# Patient Record
Sex: MALE | Race: WHITE | Employment: PART TIME | URBAN - METROPOLITAN AREA
[De-identification: names, ages, dates, MRNs, and addresses within clinical notes are randomized per-mention and may not be internally consistent; named-entity substitution may affect disease eponyms.]

---

## 2018-07-28 ENCOUNTER — HOSPITAL ENCOUNTER (EMERGENCY)
Age: 73
Discharge: HOME | End: 2018-07-29
Attending: EMERGENCY MEDICINE
Payer: MEDICARE

## 2018-07-28 ENCOUNTER — APPOINTMENT (OUTPATIENT)
Dept: GENERAL RADIOLOGY | Age: 73
End: 2018-07-28
Attending: EMERGENCY MEDICINE
Payer: MEDICARE

## 2018-07-28 DIAGNOSIS — R10.13 ABDOMINAL PAIN, EPIGASTRIC: Primary | ICD-10-CM

## 2018-07-28 PROCEDURE — 36415 COLL VENOUS BLD VENIPUNCTURE: CPT | Performed by: EMERGENCY MEDICINE

## 2018-07-28 PROCEDURE — 85025 COMPLETE CBC W/AUTO DIFF WBC: CPT | Performed by: EMERGENCY MEDICINE

## 2018-07-28 PROCEDURE — 99285 EMERGENCY DEPT VISIT HI MDM: CPT

## 2018-07-28 PROCEDURE — 84484 ASSAY OF TROPONIN QUANT: CPT | Performed by: EMERGENCY MEDICINE

## 2018-07-28 PROCEDURE — 71045 X-RAY EXAM CHEST 1 VIEW: CPT

## 2018-07-28 PROCEDURE — 80048 BASIC METABOLIC PNL TOTAL CA: CPT | Performed by: EMERGENCY MEDICINE

## 2018-07-28 PROCEDURE — 93005 ELECTROCARDIOGRAM TRACING: CPT

## 2018-07-28 RX ORDER — ASPIRIN 81 MG/1
81 TABLET ORAL DAILY
COMMUNITY

## 2018-07-28 RX ORDER — METFORMIN HYDROCHLORIDE 1000 MG/1
1000 TABLET ORAL 2 TIMES DAILY WITH MEALS
COMMUNITY

## 2018-07-28 RX ORDER — LEVOTHYROXINE SODIUM 175 UG/1
175 TABLET ORAL
COMMUNITY

## 2018-07-28 RX ORDER — CLOPIDOGREL BISULFATE 75 MG/1
75 TABLET ORAL DAILY
COMMUNITY

## 2018-07-28 RX ORDER — METOPROLOL SUCCINATE 25 MG/1
25 TABLET, EXTENDED RELEASE ORAL DAILY
COMMUNITY

## 2018-07-28 RX ORDER — SODIUM CHLORIDE 0.9 % (FLUSH) 0.9 %
5-10 SYRINGE (ML) INJECTION AS NEEDED
Status: DISCONTINUED | OUTPATIENT
Start: 2018-07-28 | End: 2018-07-29 | Stop reason: HOSPADM

## 2018-07-29 VITALS
OXYGEN SATURATION: 94 % | HEIGHT: 71 IN | BODY MASS INDEX: 33.18 KG/M2 | SYSTOLIC BLOOD PRESSURE: 105 MMHG | RESPIRATION RATE: 23 BRPM | HEART RATE: 74 BPM | WEIGHT: 237 LBS | TEMPERATURE: 98.9 F | DIASTOLIC BLOOD PRESSURE: 59 MMHG

## 2018-07-29 LAB
ANION GAP SERPL CALC-SCNC: 9 MMOL/L (ref 5–15)
ATRIAL RATE: 89 BPM
BASOPHILS # BLD: 0.1 K/UL (ref 0–0.1)
BASOPHILS NFR BLD: 1 % (ref 0–1)
BUN SERPL-MCNC: 21 MG/DL (ref 6–20)
BUN/CREAT SERPL: 20 (ref 12–20)
CALCIUM SERPL-MCNC: 8.9 MG/DL (ref 8.5–10.1)
CALCULATED P AXIS, ECG09: 14 DEGREES
CALCULATED R AXIS, ECG10: 79 DEGREES
CALCULATED T AXIS, ECG11: -4 DEGREES
CHLORIDE SERPL-SCNC: 104 MMOL/L (ref 97–108)
CO2 SERPL-SCNC: 24 MMOL/L (ref 21–32)
CREAT SERPL-MCNC: 1.07 MG/DL (ref 0.7–1.3)
DIAGNOSIS, 93000: NORMAL
DIFFERENTIAL METHOD BLD: ABNORMAL
EOSINOPHIL # BLD: 0 K/UL (ref 0–0.4)
EOSINOPHIL NFR BLD: 0 % (ref 0–7)
ERYTHROCYTE [DISTWIDTH] IN BLOOD BY AUTOMATED COUNT: 16.2 % (ref 11.5–14.5)
GLUCOSE SERPL-MCNC: 119 MG/DL (ref 65–100)
HCT VFR BLD AUTO: 41 % (ref 36.6–50.3)
HGB BLD-MCNC: 13 G/DL (ref 12.1–17)
IMM GRANULOCYTES # BLD: 0.1 K/UL (ref 0–0.04)
IMM GRANULOCYTES NFR BLD AUTO: 1 % (ref 0–0.5)
LYMPHOCYTES # BLD: 2.5 K/UL (ref 0.8–3.5)
LYMPHOCYTES NFR BLD: 23 % (ref 12–49)
MCH RBC QN AUTO: 27 PG (ref 26–34)
MCHC RBC AUTO-ENTMCNC: 31.7 G/DL (ref 30–36.5)
MCV RBC AUTO: 85.1 FL (ref 80–99)
MONOCYTES # BLD: 1 K/UL (ref 0–1)
MONOCYTES NFR BLD: 9 % (ref 5–13)
NEUTS SEG # BLD: 7.1 K/UL (ref 1.8–8)
NEUTS SEG NFR BLD: 66 % (ref 32–75)
NRBC # BLD: 0 K/UL (ref 0–0.01)
NRBC BLD-RTO: 0 PER 100 WBC
P-R INTERVAL, ECG05: 160 MS
PLATELET # BLD AUTO: 251 K/UL (ref 150–400)
PMV BLD AUTO: 10.5 FL (ref 8.9–12.9)
POTASSIUM SERPL-SCNC: 3.8 MMOL/L (ref 3.5–5.1)
Q-T INTERVAL, ECG07: 360 MS
QRS DURATION, ECG06: 96 MS
QTC CALCULATION (BEZET), ECG08: 438 MS
RBC # BLD AUTO: 4.82 M/UL (ref 4.1–5.7)
SODIUM SERPL-SCNC: 137 MMOL/L (ref 136–145)
TROPONIN I SERPL-MCNC: <0.05 NG/ML
TROPONIN I SERPL-MCNC: <0.05 NG/ML
VENTRICULAR RATE, ECG03: 89 BPM
WBC # BLD AUTO: 10.8 K/UL (ref 4.1–11.1)

## 2018-07-29 PROCEDURE — 36415 COLL VENOUS BLD VENIPUNCTURE: CPT | Performed by: EMERGENCY MEDICINE

## 2018-07-29 PROCEDURE — 84484 ASSAY OF TROPONIN QUANT: CPT | Performed by: EMERGENCY MEDICINE

## 2018-07-29 NOTE — ED PROVIDER NOTES
EMERGENCY DEPARTMENT HISTORY AND PHYSICAL EXAM 
     
 
Date: 7/28/2018 Patient Name: Erna Odell History of Presenting Illness Chief Complaint Patient presents with  Chest Pain History Provided By: Patient and EMS 
 
HPI: Erna Odell is a 81 y.o. male, pmhx CAD / stroke / DM / hypothyroid, who presents via EMS to the ED c/o gradual onset 10/10 \"heavy gas-like\" mid-sternal, non-radiating CP that began while driving this evening. Pt reports pulling over and taking 3 NTG, ~15 minutes apart, with minimal relief of sxs, at which time he called EMS. EMS reports giving the pt 324mg ASA. On evaluation in the ED, pt currently c/o nausea, and states his pain is improved to a 1/10. Pt states his \"usual heart attack pain\" occurs in his L shoulder and down his LUE. He denies any similar sxs at this time. Pt reports a hx of DM for which he takes Metformin daily. He further notes adherence with his daily Plavix and ASA. Pt states his last cardiac stress test was \"not too long ago\" and reportedly normal. He notes his last cardiac catheterization was \"a few years ago, but it was unchanged since 2010. \" Pt otherwise specifically denies any recent fevers, chills, vomiting, diarrhea, abd pain, SOB, urinary sxs, changes in BM, or headache. PCP: [facility]: Honey Dillon MD ([facility]) PMHx: Significant for MI, TIA, DM, CAD, hypothyroid PSHx: Significant for cardiac cath / stents, orthopedic surgery, hernia repair Social Hx: -tobacco (former), +EtOH (rarely), -Illicit Drugs There are no other complaints, changes, or physical findings at this time. Past History Past Medical History: 
Past Medical History:  
Diagnosis Date  CAD (coronary artery disease) stents 6; caths 17  
 Diabetes ([address]  Hypothyroid   
 no thyroid  Stroke ([facility] [address] Past Surgical History: 
Past Surgical History:  
Procedure Laterality Date  CARDIAC SURG PROCEDURE UNLIST    
 6 stents; 17 caths  HX GI    
 hernia repair  HX ORTHOPAEDIC    
 back  HX ORTHOPAEDIC Left   
 carpal tunnel  HX ORTHOPAEDIC Right   
 carpal tunnel  HX ORTHOPAEDIC Right   
 index finger and little finger Family History: 
History reviewed. No pertinent family history. Social History: 
Social History Substance Use Topics  Smoking status: Former Smoker  Smokeless tobacco: Never Used  Alcohol use Yes Comment: rarely Allergies: Allergies Allergen Reactions  Crestor [Rosuvastatin] Hives  Medrol [Methylprednisolone] Hives  Penicillins Nausea Only Review of Systems Review of Systems Constitutional: Negative for activity change, appetite change, chills, fever and unexpected weight change. HENT: Negative for congestion. Eyes: Negative for pain and visual disturbance. Respiratory: Negative for cough and shortness of breath. Cardiovascular: Positive for chest pain. Gastrointestinal: Positive for nausea. Negative for abdominal pain, diarrhea and vomiting. Genitourinary: Negative for dysuria. Musculoskeletal: Negative for back pain. Skin: Negative for rash. Neurological: Negative for headaches. Physical Exam  
Physical Exam  
Constitutional: He is oriented to person, place, and time. He appears well-developed and well-nourished. HENT:  
Head: Normocephalic and atraumatic. Mouth/Throat: Oropharynx is clear and moist.  
Eyes: Conjunctivae and EOM are normal. Pupils are equal, round, and reactive to light. Right eye exhibits no discharge. Left eye exhibits no discharge. Neck: Normal range of motion. Neck supple. Cardiovascular: Normal rate, regular rhythm and normal heart sounds. No murmur heard. Pulmonary/Chest: Effort normal and breath sounds normal. No respiratory distress. He has no wheezes. He has no rales. Abdominal: Soft. Bowel sounds are normal. He exhibits no distension. There is no tenderness.   
Musculoskeletal: Normal range of motion. He exhibits no edema. Neurological: He is alert and oriented to person, place, and time. No cranial nerve deficit. He exhibits normal muscle tone. Skin: Skin is warm and dry. No rash noted. He is not diaphoretic. Nursing note and vitals reviewed. Diagnostic Study Results Labs - Recent Results (from the past 12 hour(s)) EKG, 12 LEAD, INITIAL Collection Time: 07/28/18 10:57 PM  
Result Value Ref Range Ventricular Rate 89 BPM  
 Atrial Rate 89 BPM  
 P-R Interval 160 ms QRS Duration 96 ms  
 Q-T Interval 360 ms QTC Calculation (Bezet) 438 ms Calculated P Axis 14 degrees Calculated R Axis 79 degrees Calculated T Axis -4 degrees Diagnosis Normal sinus rhythm Abnormal QRS-T angle, consider primary T wave abnormality No previous ECGs available CBC WITH AUTOMATED DIFF Collection Time: 07/28/18 11:47 PM  
Result Value Ref Range WBC 10.8 4.1 - 11.1 K/uL  
 RBC 4.82 4.10 - 5.70 M/uL  
 HGB 13.0 12.1 - 17.0 g/dL HCT 41.0 36.6 - 50.3 % MCV 85.1 80.0 - 99.0 FL  
 MCH 27.0 26.0 - 34.0 PG  
 MCHC 31.7 30.0 - 36.5 g/dL  
 RDW 16.2 (H) 11.5 - 14.5 % PLATELET 059 548 - 905 K/uL MPV 10.5 8.9 - 12.9 FL  
 NRBC 0.0 0  WBC ABSOLUTE NRBC 0.00 0.00 - 0.01 K/uL NEUTROPHILS 66 32 - 75 % LYMPHOCYTES 23 12 - 49 % MONOCYTES 9 5 - 13 % EOSINOPHILS 0 0 - 7 % BASOPHILS 1 0 - 1 % IMMATURE GRANULOCYTES 1 (H) 0.0 - 0.5 % ABS. NEUTROPHILS 7.1 1.8 - 8.0 K/UL  
 ABS. LYMPHOCYTES 2.5 0.8 - 3.5 K/UL  
 ABS. MONOCYTES 1.0 0.0 - 1.0 K/UL  
 ABS. EOSINOPHILS 0.0 0.0 - 0.4 K/UL  
 ABS. BASOPHILS 0.1 0.0 - 0.1 K/UL  
 ABS. IMM. GRANS. 0.1 (H) 0.00 - 0.04 K/UL  
 DF AUTOMATED METABOLIC PANEL, BASIC Collection Time: 07/28/18 11:47 PM  
Result Value Ref Range Sodium 137 136 - 145 mmol/L Potassium 3.8 3.5 - 5.1 mmol/L Chloride 104 97 - 108 mmol/L  
 CO2 24 21 - 32 mmol/L Anion gap 9 5 - 15 mmol/L Glucose 119 (H) 65 - 100 mg/dL BUN 21 (H) 6 - 20 MG/DL Creatinine 1.07 0.70 - 1.30 MG/DL  
 BUN/Creatinine ratio 20 12 - 20 GFR est AA >60 >60 ml/min/1.73m2 GFR est non-AA >60 >60 ml/min/1.73m2 Calcium 8.9 8.5 - 10.1 MG/DL  
TROPONIN I Collection Time: 07/28/18 11:47 PM  
Result Value Ref Range Troponin-I, Qt. <0.05 <0.05 ng/mL TROPONIN I Collection Time: 07/29/18  2:56 AM  
Result Value Ref Range Troponin-I, Qt. <0.05 <0.05 ng/mL Radiologic Studies - CXR Results  (Last 48 hours) 07/28/18 2356  XR CHEST PORT Final result Impression:  Impression: 1. No acute disease Narrative:  INDICATION:  cp   
   
Exam: Portable chest 2351. Comparison: None. Findings: Cardiomediastinal silhouette is within normal limits. Pulmonary  
vasculature is not engorged. There are no focal parenchymal opacities,  
effusions, or pneumothorax. Right hemidiaphragm is elevated Medical Decision Making I am the first provider for this patient. I reviewed the vital signs, available nursing notes, past medical history, past surgical history, family history and social history. Vital Signs-Reviewed the patient's vital signs. Patient Vitals for the past 12 hrs: 
 Temp Pulse Resp BP SpO2  
07/29/18 0230 - 80 24 116/70 94 %  
07/29/18 0215 - 80 23 107/70 94 %  
07/29/18 0200 - 82 26 113/71 94 %  
07/29/18 0145 - 83 25 117/71 94 %  
07/29/18 0130 - 87 20 121/65 95 %  
07/29/18 0115 - 75 23 119/78 95 %  
07/29/18 0111 - 78 17 - 95 %  
07/29/18 0109 - - - 117/73 -  
07/29/18 0100 - 78 19 108/72 95 %  
07/29/18 0045 - 77 20 108/68 96 %  
07/29/18 0030 - 78 22 116/75 95 %  
07/29/18 0015 - 80 28 110/75 96 %  
07/28/18 2345 - 80 20 107/71 96 %  
07/28/18 2330 - 83 14 106/76 96 %  
07/28/18 2315 - 85 16 111/67 93 % 07/28/18 2305 98.9 °F (37.2 °C) 89 16 122/77 93 % 07/28/18 2303 - - - 122/77 -  
 
 
Pulse Oximetry Analysis - 94% on RA Cardiac Monitor:  
Rate: 90 bpm 
Rhythm: Normal Sinus Rhythm Records Reviewed: Nursing Notes, Old Medical Records, Previous electrocardiograms, Ambulance Run Sheet, Previous Radiology Studies and Previous Laboratory Studies Provider Notes (Medical Decision Making): MDM: elderly male with significant previous cardiac disease presenting with CP. Currently appears well, lower suspicion for PE and dissection. Exam and EKG WNL. Symptomatic management with observation and delta troponin evaluation. ED Course:  
Initial assessment performed. The patients presenting problems have been discussed, and they are in agreement with the care plan formulated and outlined with them. I have encouraged them to ask questions as they arise throughout their visit. EKG interpretation: (Preliminary) 2302 Rhythm: normal sinus rhythm. Rate (approx.): 89bpm; Axis: normal; Normal AR, QRS, QTc intervals; ST/T wave: normal; Other findings: non-ischemic. Written by Zana Ennis, ED Scribe, as dictated by Yue Masterson MD 
 
PROGRESS NOTE: 
2:23 AM 
Per [facility] chart review, pt's last ECHO in 03/2013 reports an EF 65%. Written by Zana Ennis, ED Scribe, as dictated by Yue Masterson MD 
 
Progress note: 
3:46 AM 
Pt noted to be feeling better, sxs resolved throughout ED stay, ready for discharge. Updated pt and/or family on all final lab and imaging findings. Will follow up as instructed. All questions have been answered, pt voiced understanding and agreement with plan. Specific return precautions provided as well as instructions to return to the ED should sx worsen at any time. Vital signs stable for discharge. Written by Zana Ennis, ED Scribe, as dictated by Yue Masterson MD  
 
 
Diagnosis Clinical Impression: 1. Abdominal pain, epigastric PLAN: 
1. Current Discharge Medication List  
  
 
2. Follow-up Information Follow up With Details Comments Contact Info  [facility] EMERGENCY DEPT   [address] [phone] [id number] 
[phone] Return to ED if worse Disposition: 
 
DISCHARGE NOTE: 
3:46 AM 
The patient's results have been reviewed with family and/or caregiver. They verbally convey their understanding and agreement of the patient's signs, symptoms, diagnosis, treatment, and prognosis. They additionally agree to follow up as recommended in the discharge instructions or to return to the Emergency Room should the patient's condition change prior to their follow-up appointment. The family and/or caregiver verbally agrees with the care-plan and all of their questions have been answered. The discharge instructions have also been provided to the them along with educational information regarding the patient's diagnosis and a list of reasons why the patient would want to return to the ER prior to their follow-up appointment should their condition change. Written by SUZIE Suarezibe, as dictated by Talia Howard MD. Attestations: This note is prepared by Oneil Bsuh, acting as Scribe for MD Talia Griffin MD : The scribe's documentation has been prepared under my direction and personally reviewed by me in its entirety. I confirm that the note above accurately reflects all work, treatment, procedures, and medical decision making performed by me. This note will not be viewable in [address]

## 2018-07-29 NOTE — DISCHARGE INSTRUCTIONS
Indigestion (Dyspepsia or Heartburn): Care Instructions  Your Care Instructions  Sometimes it can be hard to pinpoint the cause of indigestion. (It is also called dyspepsia or heartburn.) Most cases of an upset stomach with bloating, burning, burping, and nausea are minor and go away within several hours. Home treatment and over-the-counter medicine often are able to control symptoms. But if you take medicine to relieve your indigestion without making diet and lifestyle changes, your symptoms are likely to return again and again. If you get indigestion often, it may be a sign of a more serious medical problem. Be sure to follow up with your doctor, who may want to do tests to be sure of the cause of your indigestion. Follow-up care is a key part of your treatment and safety. Be sure to make and go to all appointments, and call your doctor if you are having problems. It's also a good idea to know your test results and keep a list of the medicines you take. How can you care for yourself at home? · Your doctor may recommend over-the-counter medicine. For mild or occasional indigestion, antacids such as Gaviscon, Mylanta, Maalox, or Tums, may help. Be safe with medicines. Be careful when you take over-the-counter antacid medicines. Many of these medicines have aspirin in them. Read the label to make sure that you are not taking more than the recommended dose. Too much aspirin can be harmful. · Your doctor also may recommend over-the-counter acid reducers, such as Pepcid AC, Tagamet HB, Zantac 75, or Prilosec. Read and follow all instructions on the label. If you use these medicines often, talk with your doctor. · Change your eating habits. ¨ It's best to eat several small meals instead of two or three large meals. ¨ After you eat, wait 2 to 3 hours before you lie down. ¨ Chocolate, mint, and alcohol can make GERD worse.   ¨ Spicy foods, foods that have a lot of acid (like tomatoes and oranges), and coffee can make GERD symptoms worse in some people. If your symptoms are worse after you eat a certain food, you may want to stop eating that food to see if your symptoms get better. · Do not smoke or chew tobacco. Smoking can make GERD worse. If you need help quitting, talk to your doctor about stop-smoking programs and medicines. These can increase your chances of quitting for good. · If you have GERD symptoms at night, raise the head of your bed 6 to 8 inches. You can do this by putting the frame on blocks or placing a foam wedge under the head of your mattress. (Adding extra pillows does not work.)  · Do not wear tight clothing around your middle. · Lose weight if you need to. Losing just 5 to 10 pounds can help. · Do not take anti-inflammatory medicines, such as aspirin, ibuprofen (Advil, Motrin), or naproxen (Aleve). These can irritate the stomach. If you need a pain medicine, try acetaminophen (Tylenol), which does not cause stomach upset. When should you call for help? Call your doctor now or seek immediate medical care if:    · You have new or worse belly pain.     · You are vomiting.    Watch closely for changes in your health, and be sure to contact your doctor if:    · You have new or worse symptoms of indigestion.     · You have trouble or pain swallowing.     · You are losing weight.     · You do not get better as expected. Where can you learn more? Go to http://luis-florencio.info/. Enter U659 in the search box to learn more about \"Indigestion (Dyspepsia or Heartburn): Care Instructions. \"  Current as of: May 12, 2017  Content Version: 11.7  © 9260-9577 [employer]. Care instructions adapted under license by [employer] (which disclaims liability or warranty for this information).  If you have questions about a medical condition or this instruction, always ask your healthcare professional. [address] any warranty or liability for your use of this information.

## 2018-07-29 NOTE — ED NOTES
Dr. Peter Coy at bedside to provide discharge paperwork. Vital signs stable. Pt in no apparent distress at this time. Mental status at baseline. Ambulatory to waiting room with steady gate, discharge paperwork in hand. Accompanied by family. Patient declined wheelchair at this time.

## 2018-07-29 NOTE — ED TRIAGE NOTES
Patient arrives to ED via EMS with a report of driving from [facility] and pulling over with mid substernal chest pain. Patient reports taking 3 nitro 15 minutes apart with a little relief. Patient was given 324 ASA by EMS and pain went down to 1/10. Patient reports history of 6 stents, 17 caths, and hx of TIAs. Patient denies shortness of breath, N/V/D.